# Patient Record
Sex: FEMALE | Race: WHITE | Employment: OTHER | URBAN - NONMETROPOLITAN AREA
[De-identification: names, ages, dates, MRNs, and addresses within clinical notes are randomized per-mention and may not be internally consistent; named-entity substitution may affect disease eponyms.]

---

## 2021-11-23 LAB
ALBUMIN SERPL-MCNC: 4.4 G/DL (ref 3.5–5)
ALBUMIN/GLOBULIN RATIO: 1.6 (ref 1.2–1.5)
ALK PHOSPHATASE: 80 U/L (ref 38–126)
ALT SERPL-CCNC: 27 U/L (ref 7–35)
AST SERPL-CCNC: 31 U/L (ref 10–42)
BASOPHILS # BLD: 2.1 % (ref 0–3)
BILIRUB SERPL-MCNC: 0.5 MG/DL (ref 0.3–1.2)
BUN BLDV-MCNC: 22 MG/DL (ref 7–22)
CALCIUM SERPL-MCNC: 9.6 MG/DL (ref 8.4–10.2)
CHLORIDE BLD-SCNC: 100 MEQ/L (ref 98–107)
CHOLESTEROL: 228 MG/DL (ref 0–200)
CO2: 26 MEQ/L (ref 22–31)
CREAT SERPL-MCNC: 0.7 MG/DL (ref 0.4–1.1)
EOSINOPHIL # BLD: 3.4 % (ref 0–4)
GFR SERPL CREATININE-BSD FRML MDRD: >=60 ML/MIN/{1.73_M2}
GLOBULIN: 2.8 G/DL (ref 2.9–3.3)
GLUCOSE: 89 MG/DL (ref 70–126)
HCT VFR BLD CALC: 43 % (ref 37–47)
HDLC SERPL-MCNC: 85 MG/DL (ref 40–60)
HEMOGLOBIN: 14.8 G/DL (ref 12–16)
LDL CHOLESTEROL: 131 MG/DL (ref 0–100)
LYMPHOCYTES # BLD: 36.1 % (ref 17.6–49.6)
Lab: NO
MCH RBC QN AUTO: 31.6 PG (ref 28–32)
MCHC RBC AUTO-ENTMCNC: 34.5 G/DL (ref 33–37)
MCV RBC AUTO: 91.6 FL (ref 81–99)
MONOCYTES # BLD: 9.3 % (ref 4.1–12.4)
PDW BLD-RTO: 13 % (ref 11.5–14.5)
PLATELET # BLD: 230 THOU/CUMM (ref 130–400)
POTASSIUM SERPL-SCNC: 4.7 MEQ/L (ref 3.5–5.1)
RBC: 4.69 MIL/CUMM (ref 4.2–5.4)
SCAN OF BLOOD SMEAR: NO
SEG NEUTROPHILS: 49.1 % (ref 39.4–72.5)
SODIUM BLD-SCNC: 138 MEQ/L (ref 136–145)
T4 FREE: 1.18 NG/DL (ref 0.61–1.12)
TOTAL PROTEIN: 7.2 G/DL (ref 6.4–8.3)
TRIGL SERPL-MCNC: 60 MG/DL (ref 40–150)
TSH SERPL DL<=0.05 MIU/L-ACNC: 0.1 UIU/ML (ref 0.49–4.67)
WBC: 3.7 THOU/CUMM (ref 4.8–10.8)

## 2021-12-17 RX ORDER — FLUTICASONE PROPIONATE 50 MCG
2 SPRAY, SUSPENSION (ML) NASAL DAILY
COMMUNITY

## 2021-12-17 RX ORDER — LEVOCETIRIZINE DIHYDROCHLORIDE 5 MG/1
5 TABLET, FILM COATED ORAL NIGHTLY
COMMUNITY

## 2021-12-17 RX ORDER — LEVOTHYROXINE SODIUM 0.15 MG/1
150 TABLET ORAL DAILY
COMMUNITY

## 2021-12-17 RX ORDER — MONTELUKAST SODIUM 10 MG/1
10 TABLET ORAL NIGHTLY
COMMUNITY

## 2021-12-17 RX ORDER — BUDESONIDE AND FORMOTEROL FUMARATE DIHYDRATE 160; 4.5 UG/1; UG/1
2 AEROSOL RESPIRATORY (INHALATION) 2 TIMES DAILY
COMMUNITY

## 2021-12-17 NOTE — PROGRESS NOTES
PAT call attempted, patient unavailable, left message to please call us back at your earliest convenience; [phone]

## 2021-12-24 ENCOUNTER — ANESTHESIA (OUTPATIENT)
Dept: OPERATING ROOM | Age: 71
End: 2021-12-24
Payer: MEDICARE

## 2021-12-24 ENCOUNTER — ANESTHESIA EVENT (OUTPATIENT)
Dept: OPERATING ROOM | Age: 71
End: 2021-12-24
Payer: MEDICARE

## 2021-12-24 ENCOUNTER — APPOINTMENT (OUTPATIENT)
Dept: GENERAL RADIOLOGY | Age: 71
End: 2021-12-24
Attending: PODIATRIST
Payer: MEDICARE

## 2021-12-24 ENCOUNTER — HOSPITAL ENCOUNTER (OUTPATIENT)
Age: 71
Setting detail: OUTPATIENT SURGERY
Discharge: HOME | End: 2021-12-24
Attending: PODIATRIST | Admitting: PODIATRIST
Payer: MEDICARE

## 2021-12-24 VITALS — SYSTOLIC BLOOD PRESSURE: 92 MMHG | OXYGEN SATURATION: 99 % | TEMPERATURE: 97.2 F | DIASTOLIC BLOOD PRESSURE: 47 MMHG

## 2021-12-24 VITALS
SYSTOLIC BLOOD PRESSURE: 119 MMHG | DIASTOLIC BLOOD PRESSURE: 58 MMHG | RESPIRATION RATE: 16 BRPM | HEIGHT: 67 IN | HEART RATE: 68 BPM | WEIGHT: 146 LBS | BODY MASS INDEX: 22.91 KG/M2 | OXYGEN SATURATION: 98 % | TEMPERATURE: 97.2 F

## 2021-12-24 DIAGNOSIS — Z41.9 ELECTIVE SURGERY: Primary | ICD-10-CM

## 2021-12-24 PROCEDURE — 7100000011 HC PHASE II RECOVERY - ADDTL 15 MIN: Performed by: PODIATRIST

## 2021-12-24 PROCEDURE — C1713 ANCHOR/SCREW BN/BN,TIS/BN: HCPCS | Performed by: PODIATRIST

## 2021-12-24 PROCEDURE — 3600000014 HC SURGERY LEVEL 4 ADDTL 15MIN: Performed by: PODIATRIST

## 2021-12-24 PROCEDURE — 7100000000 HC PACU RECOVERY - FIRST 15 MIN: Performed by: PODIATRIST

## 2021-12-24 PROCEDURE — 3700000000 HC ANESTHESIA ATTENDED CARE: Performed by: PODIATRIST

## 2021-12-24 PROCEDURE — C1776 JOINT DEVICE (IMPLANTABLE): HCPCS | Performed by: PODIATRIST

## 2021-12-24 PROCEDURE — 3209999900 FLUORO FOR SURGICAL PROCEDURES

## 2021-12-24 PROCEDURE — 2580000003 HC RX 258: Performed by: PODIATRIST

## 2021-12-24 PROCEDURE — 7100000001 HC PACU RECOVERY - ADDTL 15 MIN: Performed by: PODIATRIST

## 2021-12-24 PROCEDURE — 3700000001 HC ADD 15 MINUTES (ANESTHESIA): Performed by: PODIATRIST

## 2021-12-24 PROCEDURE — 7100000010 HC PHASE II RECOVERY - FIRST 15 MIN: Performed by: PODIATRIST

## 2021-12-24 PROCEDURE — 6360000002 HC RX W HCPCS: Performed by: NURSE ANESTHETIST, CERTIFIED REGISTERED

## 2021-12-24 PROCEDURE — 73660 X-RAY EXAM OF TOE(S): CPT

## 2021-12-24 PROCEDURE — 2709999900 HC NON-CHARGEABLE SUPPLY: Performed by: PODIATRIST

## 2021-12-24 PROCEDURE — 2500000003 HC RX 250 WO HCPCS: Performed by: PODIATRIST

## 2021-12-24 PROCEDURE — 6370000000 HC RX 637 (ALT 250 FOR IP): Performed by: STUDENT IN AN ORGANIZED HEALTH CARE EDUCATION/TRAINING PROGRAM

## 2021-12-24 PROCEDURE — 3600000004 HC SURGERY LEVEL 4 BASE: Performed by: PODIATRIST

## 2021-12-24 PROCEDURE — 6360000002 HC RX W HCPCS: Performed by: PODIATRIST

## 2021-12-24 DEVICE — IMPLANTABLE DEVICE
Type: IMPLANTABLE DEVICE | Status: FUNCTIONAL
Brand: LPT

## 2021-12-24 DEVICE — K- WIRE
Type: IMPLANTABLE DEVICE | Status: FUNCTIONAL
Brand: SWANSON

## 2021-12-24 RX ORDER — MECLIZINE HYDROCHLORIDE CHEWABLE TABLETS 25 MG/1
25 TABLET, CHEWABLE ORAL ONCE
Status: COMPLETED | OUTPATIENT
Start: 2021-12-24 | End: 2021-12-24

## 2021-12-24 RX ORDER — BUPIVACAINE HYDROCHLORIDE 5 MG/ML
INJECTION, SOLUTION EPIDURAL; INTRACAUDAL PRN
Status: DISCONTINUED | OUTPATIENT
Start: 2021-12-24 | End: 2021-12-24 | Stop reason: ALTCHOICE

## 2021-12-24 RX ORDER — PROPOFOL 10 MG/ML
INJECTION, EMULSION INTRAVENOUS PRN
Status: DISCONTINUED | OUTPATIENT
Start: 2021-12-24 | End: 2021-12-24 | Stop reason: SDUPTHER

## 2021-12-24 RX ORDER — FENTANYL CITRATE 50 UG/ML
INJECTION, SOLUTION INTRAMUSCULAR; INTRAVENOUS PRN
Status: DISCONTINUED | OUTPATIENT
Start: 2021-12-24 | End: 2021-12-24 | Stop reason: SDUPTHER

## 2021-12-24 RX ORDER — SODIUM CHLORIDE 9 MG/ML
25 INJECTION, SOLUTION INTRAVENOUS PRN
Status: DISCONTINUED | OUTPATIENT
Start: 2021-12-24 | End: 2021-12-24 | Stop reason: HOSPADM

## 2021-12-24 RX ORDER — FENTANYL CITRATE 50 UG/ML
50 INJECTION, SOLUTION INTRAMUSCULAR; INTRAVENOUS EVERY 5 MIN PRN
Status: DISCONTINUED | OUTPATIENT
Start: 2021-12-24 | End: 2021-12-24 | Stop reason: HOSPADM

## 2021-12-24 RX ORDER — MEPERIDINE HYDROCHLORIDE 25 MG/ML
12.5 INJECTION INTRAMUSCULAR; INTRAVENOUS; SUBCUTANEOUS EVERY 5 MIN PRN
Status: DISCONTINUED | OUTPATIENT
Start: 2021-12-24 | End: 2021-12-24 | Stop reason: HOSPADM

## 2021-12-24 RX ORDER — LIDOCAINE HYDROCHLORIDE 20 MG/ML
INJECTION, SOLUTION INTRAVENOUS PRN
Status: DISCONTINUED | OUTPATIENT
Start: 2021-12-24 | End: 2021-12-24 | Stop reason: SDUPTHER

## 2021-12-24 RX ORDER — CEFADROXIL 500 MG/1
500 CAPSULE ORAL 2 TIMES DAILY
Qty: 20 CAPSULE | Refills: 0 | Status: SHIPPED | OUTPATIENT
Start: 2021-12-24 | End: 2022-01-03

## 2021-12-24 RX ORDER — SODIUM CHLORIDE 9 MG/ML
INJECTION, SOLUTION INTRAVENOUS CONTINUOUS
Status: DISCONTINUED | OUTPATIENT
Start: 2021-12-24 | End: 2021-12-24 | Stop reason: HOSPADM

## 2021-12-24 RX ORDER — HYDRALAZINE HYDROCHLORIDE 20 MG/ML
5 INJECTION INTRAMUSCULAR; INTRAVENOUS EVERY 10 MIN PRN
Status: DISCONTINUED | OUTPATIENT
Start: 2021-12-24 | End: 2021-12-24 | Stop reason: HOSPADM

## 2021-12-24 RX ORDER — SODIUM CHLORIDE 0.9 % (FLUSH) 0.9 %
5-40 SYRINGE (ML) INJECTION EVERY 12 HOURS SCHEDULED
Status: DISCONTINUED | OUTPATIENT
Start: 2021-12-24 | End: 2021-12-24 | Stop reason: HOSPADM

## 2021-12-24 RX ORDER — PROMETHAZINE HYDROCHLORIDE 25 MG/ML
6.25 INJECTION, SOLUTION INTRAMUSCULAR; INTRAVENOUS
Status: DISCONTINUED | OUTPATIENT
Start: 2021-12-24 | End: 2021-12-24 | Stop reason: HOSPADM

## 2021-12-24 RX ORDER — OXYCODONE HYDROCHLORIDE AND ACETAMINOPHEN 5; 325 MG/1; MG/1
1 TABLET ORAL
Status: DISCONTINUED | OUTPATIENT
Start: 2021-12-24 | End: 2021-12-24 | Stop reason: HOSPADM

## 2021-12-24 RX ORDER — DIPHENHYDRAMINE HYDROCHLORIDE 50 MG/ML
12.5 INJECTION INTRAMUSCULAR; INTRAVENOUS
Status: DISCONTINUED | OUTPATIENT
Start: 2021-12-24 | End: 2021-12-24 | Stop reason: HOSPADM

## 2021-12-24 RX ORDER — SODIUM CHLORIDE 0.9 % (FLUSH) 0.9 %
5-40 SYRINGE (ML) INJECTION PRN
Status: DISCONTINUED | OUTPATIENT
Start: 2021-12-24 | End: 2021-12-24 | Stop reason: HOSPADM

## 2021-12-24 RX ORDER — ONDANSETRON 2 MG/ML
INJECTION INTRAMUSCULAR; INTRAVENOUS PRN
Status: DISCONTINUED | OUTPATIENT
Start: 2021-12-24 | End: 2021-12-24 | Stop reason: SDUPTHER

## 2021-12-24 RX ORDER — CYCLOBENZAPRINE HCL 5 MG
10 TABLET ORAL 3 TIMES DAILY PRN
Qty: 40 TABLET | Refills: 0 | Status: SHIPPED | OUTPATIENT
Start: 2021-12-24 | End: 2022-01-03

## 2021-12-24 RX ORDER — ONDANSETRON 2 MG/ML
4 INJECTION INTRAMUSCULAR; INTRAVENOUS
Status: DISCONTINUED | OUTPATIENT
Start: 2021-12-24 | End: 2021-12-24 | Stop reason: HOSPADM

## 2021-12-24 RX ORDER — DEXAMETHASONE SODIUM PHOSPHATE 10 MG/ML
INJECTION, EMULSION INTRAMUSCULAR; INTRAVENOUS PRN
Status: DISCONTINUED | OUTPATIENT
Start: 2021-12-24 | End: 2021-12-24 | Stop reason: SDUPTHER

## 2021-12-24 RX ORDER — HYDROCODONE BITARTRATE AND ACETAMINOPHEN 5; 325 MG/1; MG/1
1 TABLET ORAL EVERY 4 HOURS PRN
Qty: 42 TABLET | Refills: 0 | Status: SHIPPED | OUTPATIENT
Start: 2021-12-24 | End: 2021-12-31

## 2021-12-24 RX ORDER — FENTANYL CITRATE 50 UG/ML
25 INJECTION, SOLUTION INTRAMUSCULAR; INTRAVENOUS EVERY 5 MIN PRN
Status: DISCONTINUED | OUTPATIENT
Start: 2021-12-24 | End: 2021-12-24 | Stop reason: HOSPADM

## 2021-12-24 RX ORDER — ASPIRIN 325 MG
325 TABLET, DELAYED RELEASE (ENTERIC COATED) ORAL DAILY
Qty: 14 TABLET | Refills: 0 | Status: SHIPPED | OUTPATIENT
Start: 2021-12-24 | End: 2022-01-07

## 2021-12-24 RX ADMIN — CEFAZOLIN SODIUM 2000 MG: 10 INJECTION, POWDER, FOR SOLUTION INTRAVENOUS at 10:21

## 2021-12-24 RX ADMIN — MECLIZINE HYDROCHLORIDE 25 MG: 25 TABLET, CHEWABLE ORAL at 13:05

## 2021-12-24 RX ADMIN — PROPOFOL 150 MG: 10 INJECTION, EMULSION INTRAVENOUS at 10:14

## 2021-12-24 RX ADMIN — ONDANSETRON 4 MG: 2 INJECTION INTRAMUSCULAR; INTRAVENOUS at 11:19

## 2021-12-24 RX ADMIN — FENTANYL CITRATE 50 MCG: 50 INJECTION, SOLUTION INTRAMUSCULAR; INTRAVENOUS at 10:41

## 2021-12-24 RX ADMIN — FENTANYL CITRATE 100 MCG: 50 INJECTION, SOLUTION INTRAMUSCULAR; INTRAVENOUS at 10:14

## 2021-12-24 RX ADMIN — FENTANYL CITRATE 50 MCG: 50 INJECTION, SOLUTION INTRAMUSCULAR; INTRAVENOUS at 11:19

## 2021-12-24 RX ADMIN — LIDOCAINE HYDROCHLORIDE 100 MG: 20 INJECTION INTRAVENOUS at 10:14

## 2021-12-24 RX ADMIN — SODIUM CHLORIDE: 9 INJECTION, SOLUTION INTRAVENOUS at 09:16

## 2021-12-24 RX ADMIN — DEXAMETHASONE SODIUM PHOSPHATE 10 MG: 10 INJECTION, EMULSION INTRAMUSCULAR; INTRAVENOUS at 10:33

## 2021-12-24 ASSESSMENT — PULMONARY FUNCTION TESTS
PIF_VALUE: 13
PIF_VALUE: 14
PIF_VALUE: 4
PIF_VALUE: 14
PIF_VALUE: 13
PIF_VALUE: 11
PIF_VALUE: 14
PIF_VALUE: 13
PIF_VALUE: 6
PIF_VALUE: 13
PIF_VALUE: 12
PIF_VALUE: 12
PIF_VALUE: 13
PIF_VALUE: 10
PIF_VALUE: 13
PIF_VALUE: 13
PIF_VALUE: 6
PIF_VALUE: 13
PIF_VALUE: 13
PIF_VALUE: 14
PIF_VALUE: 13
PIF_VALUE: 15
PIF_VALUE: 13
PIF_VALUE: 2
PIF_VALUE: 13
PIF_VALUE: 1
PIF_VALUE: 1
PIF_VALUE: 13
PIF_VALUE: -14
PIF_VALUE: 15
PIF_VALUE: 13
PIF_VALUE: 13
PIF_VALUE: 1
PIF_VALUE: 13
PIF_VALUE: 2
PIF_VALUE: 1
PIF_VALUE: 13
PIF_VALUE: 10
PIF_VALUE: 12
PIF_VALUE: 13
PIF_VALUE: 13
PIF_VALUE: 15
PIF_VALUE: 14
PIF_VALUE: 13
PIF_VALUE: 15
PIF_VALUE: 13
PIF_VALUE: 15
PIF_VALUE: 13
PIF_VALUE: 14
PIF_VALUE: 14
PIF_VALUE: 15
PIF_VALUE: 13
PIF_VALUE: 7
PIF_VALUE: 13
PIF_VALUE: 12
PIF_VALUE: 13
PIF_VALUE: 13
PIF_VALUE: -15
PIF_VALUE: 13
PIF_VALUE: 12
PIF_VALUE: 13
PIF_VALUE: 13
PIF_VALUE: 19

## 2021-12-24 ASSESSMENT — PAIN SCALES - GENERAL
PAINLEVEL_OUTOF10: 0

## 2021-12-24 NOTE — ANESTHESIA PRE PROCEDURE
Department of Anesthesiology  Preprocedure Note       Name:  Yasir Britton   Age:  73 y.o.  :  1950                                          MRN:  [id number]         Date:  2021      Surgeon: Dillan Lopez):  Zan Dean DPM    Procedure: Procedure(s):  RIGHT ARMAAN ARTHROPLASTY FIRST METATARSOPHALANGEAL POSSIBLE FIFTH METATARSAOPHALANGEAL JOINT CAPSULOTOMY    Medications prior to admission:   Prior to Admission medications    Medication Sig Start Date End Date Taking?  Authorizing Provider   fluticasone (FLONASE) 50 MCG/ACT nasal spray 2 sprays by Each Nostril route daily   Yes Historical Provider, MD   levothyroxine (SYNTHROID) 150 MCG tablet Take 150 mcg by mouth Daily   Yes Historical Provider, MD   Omalizumab (Maurene Goel SC) Inject 300 mg into the skin Every 4 weeks   Yes Historical Provider, MD   levocetirizine (XYZAL) 5 MG tablet Take 5 mg by mouth nightly   Yes Historical Provider, MD   montelukast (SINGULAIR) 10 MG tablet Take 10 mg by mouth nightly   Yes Historical Provider, MD   budesonide-formoterol (SYMBICORT) 160-4.5 MCG/ACT AERO Inhale 2 puffs into the lungs 2 times daily   Yes Historical Provider, MD   NONFORMULARY Allergy shots every 3 weeks   Yes Historical Provider, MD   Albuterol (PROVENTIL IN) Inhale into the lungs as needed    Historical Provider, MD       Current medications:    Current Facility-Administered Medications   Medication Dose Route Frequency Provider Last Rate Last Admin    0.9 % sodium chloride infusion   IntraVENous Continuous Alver Ip,  mL/hr at 21 0916 New Bag at 21 0916    sodium chloride flush 0.9 % injection 5-40 mL  5-40 mL IntraVENous 2 times per day Alver Ip, DPM        sodium chloride flush 0.9 % injection 5-40 mL  5-40 mL IntraVENous PRN Alver Ip, DPM        0.9 % sodium chloride infusion  25 mL IntraVENous PRN Alver Ip, DPM        ceFAZolin (ANCEF) 2000 mg in dextrose 5 % 50 mL IVPB  2,000 mg IntraVENous On Call to [address], DPM           Allergies: Allergies   Allergen Reactions    Bee Venom Anaphylaxis    Nsaids Anaphylaxis     Pseudo allergy due to Samter's Triad    Seasonal      Takes allergy shots    Biaxin [Clarithromycin] Nausea And Vomiting    Tramadol      Causes itching but can tolerate if taken with benadryl    Versed [Midazolam] Nausea And Vomiting     When combined with fentanyl       Problem List:  There is no problem list on file for this patient.       Past Medical History:        Diagnosis Date    Cancer ([address]     Kidney stone     PONV (postoperative nausea and vomiting)     Samter's triad     Thyroid disease     goiter    Urticaria     hx of spontaneous about 2 years ago now takes Xolair injections       Past Surgical History:        Procedure Laterality Date    CHOLECYSTECTOMY      ERCP      EYE SURGERY      cataract with lense implant bilat    FINGER TRIGGER RELEASE      bilat    KNEE ARTHROSCOPY      2 on right and 1 on left    LUMBAR LAMINECTOMY      L5-S1    SINUS SURGERY      x5    TONSILLECTOMY      and adnoids       Social History:    Social History     Tobacco Use    Smoking status: Never Smoker    Smokeless tobacco: Never Used   Substance Use Topics    Alcohol use: Never                                Counseling given: Not Answered      Vital Signs (Current):   Vitals:    12/17/21 1026 12/24/21 0845   BP:  120/60   Pulse:  73   Resp:  18   Temp:  97.7 °F (36.5 °C)   TempSrc:  Temporal   SpO2:  100%   Weight: 146 lb (66.2 kg) 146 lb (66.2 kg)   Height: 5' 7\" (1.702 m) 5' 7\" (1.702 m)                                              BP Readings from Last 3 Encounters:   12/24/21 120/60       NPO Status: Time of last liquid consumption: 2200                        Time of last solid consumption: 2200                        Date of last liquid consumption: 12/23/21                        Date of last solid food consumption: 12/23/21    BMI:   Wt Readings from Last 3 Encounters:   12/24/21 146 lb (66.2 kg)     Body mass index is 22.87 kg/m². CBC:   Lab Results   Component Value Date    WBC 3.7 11/23/2021    RBC 4.69 11/23/2021    HGB 14.8 11/23/2021    HCT 43.0 11/23/2021    MCV 91.6 11/23/2021    RDW 13.0 11/23/2021     11/23/2021       CMP:   Lab Results   Component Value Date     11/23/2021    K 4.7 11/23/2021     11/23/2021    CO2 26 11/23/2021    BUN 22 11/23/2021    CREATININE 0.7 11/23/2021    LABGLOM >=60 11/23/2021    GLUCOSE 89 11/23/2021    CALCIUM 9.6 11/23/2021    BILITOT 0.5 11/23/2021    ALKPHOS 80 11/23/2021    AST 31 11/23/2021    ALT 27 11/23/2021       POC Tests: No results for input(s): POCGLU, POCNA, POCK, POCCL, POCBUN, POCHEMO, POCHCT in the last 72 hours. Coags: No results found for: PROTIME, INR, APTT    HCG (If Applicable): No results found for: PREGTESTUR, PREGSERUM, HCG, HCGQUANT     ABGs: No results found for: PHART, PO2ART, NNS0DLE, CDD4MTX, BEART, W0FMNLNL     Type & Screen (If Applicable):  No results found for: LABABO, LABRH    Drug/Infectious Status (If Applicable):  No results found for: HIV, HEPCAB    COVID-19 Screening (If Applicable): No results found for: COVID19        Anesthesia Evaluation     history of anesthetic complications: PONV. Airway: Mallampati: I  TM distance: >3 FB   Neck ROM: limited  Mouth opening: > = 3 FB Dental:          Pulmonary:normal exam    (+) asthma: allergic asthma,                            Cardiovascular:  Exercise tolerance: good (>4 METS),       (-) hypertension, past MI and CAD      Rhythm: regular  Rate: normal                    Neuro/Psych:      (-) seizures and CVA           GI/Hepatic/Renal:        (-) GERD, liver disease and no renal disease       Endo/Other:    (+) hypothyroidism::., .    (-) diabetes mellitus, hyperthyroidism                ROS comment: Hx chronic Urticaria Abdominal:             Vascular:     - DVT and PE.       Other Findings:           Anesthesia Plan      general     ASA 2     (PIV. Additional access can be obtained after induction if needed. Standard ASA monitors. IV/PO opioids and other adjuncts as needed for pain control. PACU post op for recovery. Possible anesthetics complications were discussed with the patient, including but not limited to: PONV, damage to the airway and surrounding structures (teeth, lips, gums, tongue, etc.), adverse reactions to medicine, cardiac complications (MI, CHF, arrhythmias, etc.), respiratory complications (post-op ventilation, respiratory failure, etc.), neurologic complications (nerve damage, stroke, seizure), and death. The patient was given the opportunity to ask questions and all questions were answered to the patient's satisfaction. The patient is in agreement with the anesthetic plan.  )  Induction: intravenous. Anesthetic plan and risks discussed with patient and spouse. Plan discussed with CRNA.                   Damian Stanford DO   12/24/2021

## 2021-12-24 NOTE — PROGRESS NOTES
ADMITTED TO SDS AND ORIENTED TO UNIT. SCDS ON. FALL AND ALLERGY BANDS ON. PT VERBALIZED APPROVAL FOR FIRST NAME, LAST INITIAL AND PHYSICIAN NAME ON UNIT WHITEBOARD. Spouse, Uzma Begumning with the patient.

## 2021-12-24 NOTE — PROGRESS NOTES
Return from PACU, family at bedside. Eating and drinking without problems. Good neuro and circ checks to foot.

## 2021-12-24 NOTE — PROGRESS NOTES
9850 1142 Pt arouses to name on arrival to PACU   1130 awake and oriented , denies any needs and drifts back to sleep   1144 awakens on own continues to deny pain , denies wanting to premedicate for pain , states she wants to wake up more , pt able to drift back to sleep easily   1155 awakens easily to name , continues to deny pain or nausea  1200 meets criteria for discharge , transported to [facility]

## 2021-12-24 NOTE — H&P
Department of Surgery  H&P Interval Note  Outpatient History and Physical was reviewed pre-operatively, with no interval changes to note prior to scheduled surgical intervention. Patient was assessed, all questions/concerns regarding per-operative management were addressed to patient satisfaction. Operative site was marked prior to transportation to the operative room. María Elena Villegas D.P.M.

## 2021-12-24 NOTE — ANESTHESIA POSTPROCEDURE EVALUATION
Department of Anesthesiology  Postprocedure Note    Patient: Chris Mcallister  MRN: [id number]  YOB: 1950  Date of evaluation: 12/24/2021  Time:  1:02 PM     Procedure Summary     Date: 12/24/21 Room / Location: [place] [address] / [facility]    Anesthesia Start: 1009 Anesthesia Stop: 1128    Procedure: RIGHT ARMAAN ARTHROPLASTY FIRST METATARSOPHALANGEAL,  FIFTH METATARSAOPHALANGEAL JOINT CAPSULOTOMY (Right Foot) Diagnosis: (RIGHT HALLUX LIMITUS FIFTH METATARSAL PHALANGEAL JOINT ARTHRITIS)    Surgeons: Erasto Paez DPM Responsible Provider: Reyna Baez DO    Anesthesia Type: general ASA Status: 2          Anesthesia Type: general    Kevin Phase I: Kevin Score: 10    Kevin Phase II: Kevin Score: 10    Last vitals: Reviewed and per EMR flowsheets.        Anesthesia Post Evaluation    Patient location during evaluation: PACU  Patient participation: complete - patient participated  Level of consciousness: awake and alert  Airway patency: patent  Nausea & Vomiting: no vomiting and no nausea  Complications: no  Cardiovascular status: hemodynamically stable  Respiratory status: acceptable  Hydration status: stable

## 2021-12-24 NOTE — BRIEF OP NOTE
Brief Postoperative Note      Patient: Gloria Mott  YOB: 1950  MRN: [id number]    Date of Procedure: 12/24/2021    Pre-Op Diagnosis: RIGHT HALLUX LIMITUS FIFTH METATARSAL PHALANGEAL JOINT ARTHRITIS, metatarsal deformity 5th     Post-Op Diagnosis: Same       Procedure(s):  RIGHT ARMAAN ARTHROPLASTY FIRST METATARSOPHALANGEAL,  FIFTH METATARSAOPHALANGEAL JOINT CAPSULOTOMY    Surgeon:  ARVIND Nicholas, PGY I    Assistant:  * No surgical staff found *    Anesthesia: General    Estimated Blood Loss (mL): Minimal    Complications: None    Specimens:   * No specimens in log *    Implants:  Implant Name Type Inv. Item Serial No.  Lot No. LRB No. Used Action   IMPLANT TOE JT SZ 2 L15MM REG TI MED BILAT PERF H LPT  IMPLANT TOE JT SZ 2 L15MM REG TI MED BILAT PERF H LPT  [employer]  Right 1 Implanted   K WIRE FIX DIA1. 4MM FOR JUAN DAVID GREAT TOE LPT  K WIRE FIX DIA1. 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

## 2021-12-27 NOTE — OP NOTE
[address] [place] [address]                                OPERATIVE REPORT    PATIENT NAME: Usama Phan                  :        1950  MED REC NO:   [id number]                           ROOM:  ACCOUNT NO:   [de-identified]                           ADMIT DATE: 2021  PROVIDER:     Marcelina Lehman. Lisandra Black D.P.M. DATE OF PROCEDURE:  2021    SURGEON:  Marcelina Lehman. Lisandra Black DPM    ASSISTANT:  Yessenia Whyte, PGY-1    PREOPERATIVE DIAGNOSES:  Include hallux limitus, right first  metatarsophalangeal joint as well as a right fifth metatarsal deformity  with fifth metatarsal joint capsulitis. POSTOPERATIVE DIAGNOSIS:  Hallux limitus, right first  metatarsophalangeal joint as well as a right fifth metatarsal deformity  with fifth metatarsal joint capsulitis. OPERATIONS PERFORMED:  1. A right first metatarsophalangeal joint cheilectomy with liberty-joint  arthroplasty. 2.  Fifth metatarsophalangeal joint capsulotomy with a fifth metatarsal  ostectomy. ANESTHESIA:  General with the addition of prophylactic antibiotics  coverage as well 20 mL 0.5% bupivacaine plain. HEMOSTASIS:  A well-padded pneumatic thigh tourniquet at 250 mmHg for  approximately 45 minutes. ESTIMATED BLOOD LOSS:  Tourniquet plus 10 mL. MATERIALS USED:  Consisted of a size 2 [employer] [place] liberty-joint  arthroplasty. INJECTABLES:  None. CONDITION:  Stable. COMPLICATIONS:  None. SPECIMENS:  Obtained. INDICATIONS FOR PROCEDURE:  The patient is a pleasant female who we have  been treating for hallux limitus for some time now. The patient has  undergone multiple corticosteroid injections in the past.  We got to a  point now where she was having intermittent pain despite use of  corticosteroid injections, shoe modifications, as well as carbon fiber  insole.   Based on nature and location of the pain as well as lack of  improvement with conservative care, the patient elected to proceed with  surgical extraction of the right forefoot. All questions and concerns  regarding perioperative management including benefits, risks,  complications, and alternatives of the procedure were discussed in  detail. DESCRIPTION OF PROCEDURE:  The patient was seen preoperatively. Consent  reviewed and signed. Operative limb was marked. The patient was taken  to the operative room, placed in supine position, administered general  anesthetic. The patient received a prophylactic antibiotic and the  operative site was supplemented with 20 mL 0.5% bupivacaine plain. Foot  was scrubbed with chlorhexidine surgical scrub and draped in sterile  fashion. PROCEDURE IN DETAIL:  1. A right foot metatarsophalangeal joint cheilectomy with liberty-joint  arthroplasty. Attention was directed to the dorsum aspect of the right  first metatarsophalangeal joint. A longitudinal incision approximately  6 cm in length was made medial to the coursing extensor hallucis longus  tendon. This incision was made full thickness with a 15 scalpel blade  followed by blunt dissection with Metzenbaum scissors and electrocautery  Bovie. The capsule of the first metatarsophalangeal joint was  subdivided in medial and lateral halves, followed by using a 15 scalpel  blade to release the medial and lateral collateral ligaments. A  McG[employer] elevator was used to gain access to the first  metatarsophalangeal joint, at which point we then performed a  cheilectomy of the first metatarsal head resected in the dorsal  one-third as well as the nonviable bone of the cuff of the proximal  phalanx base. A TPS sagittal saw was then used to create a dorsal to  plantar based through-and-through osteotomy, resecting the proximal  one-fourth of the phalangeal base down to level of intramedullary  bleeding bone.   Residual bone was resected using a rongeur bone pick and  a size 2 [facility] liberty-joint arthroplasty trial was placed noting  good excellent coverage over the residual bone. This was pinned in  place using a Steinmann pin, followed by use of an intramedullary reamer  and a size 2 broach. The instrumentation was removed. A size 2 trial  was then placed and a good adequate coverage as well as positioning  looked clinically as well as radiographically. We then obtained a size  2 liberty-joint arthroplasty from [facility] which was then impacted  with a small bone and tamp noting good apposition of the bone surfaces  as well as anatomical alignment on both AP and lateral fluoroscopic  imaging. A medial based capsulorrhaphy was performed removing a 3 mm  biased wedge from the capsular tissue which was then imbricated with an  intracapsular 2-0 FiberWire closure, followed by closure of the capsular  tissue with a running 2-0 Vicryl stitch, followed by 3-0 Monocryl and  3-0 Prolene for subcutaneous and primary skin closure. 2.  Right fifth metatarsophalangeal joint capsulotomy. A longitudinal  incision was then made overlying the dorsolateral aspect of the right  fifth metatarsophalangeal joint approximately 3 in length. This  incision was made full thickness with a 15 blade scalpel, followed by  blunt dissection with Metzenbaum scissors and electrocautery Bovie. The  patient was noted to have a dorsal-based contraction of the fifth  metatarsophalangeal joint which was released using a 15 scalpel blade  carefully protecting the long extensor tendon as well as releasing the  dorsal-based contracture allowing for rectus positioning of the toe  within the sagittal plane. During the dissection, the patient was noted  to have hypertrophic fifth metatarsal head. 3.  Fifth metatarsal ostectomy.   Following release of the fifth  metatarsophalangeal joint capsular contraction, TPS sagittal saw was  then used to create a Silver's osteotomy resecting the lateral  hypertrophic bone of the metatarsal as well as a planer planning of the  condylar surface of the metatarsal as well. This was then contoured  using a small hand rasp. Operative site was then flushed with copious  amounts of normal saline, closed in layered fashion consisting of 2-0  Vicryl for capsular closure, followed by 3-0 Monocryl and 3-0 Prolene  for subcutaneous and primary skin closure. The patient tolerated the  procedure well and was placed in Betadine wet-to-dry dressing with  application of short-leg posterior splint. The patient was transferred  to the PACU in stable condition. All questions and concerns regarding  postoperative management were addressed.         Lazarus Lied, D.P.M.    D: 12/27/2021 7:48:16       T: 12/27/2021 13:26:11     [facility]  Job#: [id number]     Doc#: [id number]    CC:

## 2024-09-13 ENCOUNTER — LAB (OUTPATIENT)
Age: 74
End: 2024-09-13

## 2024-09-23 LAB — CYTOLOGY THIN PREP PAP: NORMAL

## 2024-10-21 ENCOUNTER — LAB (OUTPATIENT)
Age: 74
End: 2024-10-21

## 2024-10-21 LAB
BASOPHILS ABSOLUTE: 0.1 THOU/MM3 (ref 0–0.1)
BASOPHILS NFR BLD AUTO: 1.6 %
CRP SERPL-MCNC: < 0.3 MG/DL (ref 0–1)
DEPRECATED RDW RBC AUTO: 42.4 FL (ref 35–45)
EOSINOPHIL NFR BLD AUTO: 3 %
EOSINOPHILS ABSOLUTE: 0.1 THOU/MM3 (ref 0–0.4)
ERYTHROCYTE [DISTWIDTH] IN BLOOD BY AUTOMATED COUNT: 12 % (ref 11.5–14.5)
ERYTHROCYTE [SEDIMENTATION RATE] IN BLOOD BY WESTERGREN METHOD: 13 MM/HR (ref 0–20)
HCT VFR BLD AUTO: 37.5 % (ref 37–47)
HGB BLD-MCNC: 12.7 GM/DL (ref 12–16)
IMM GRANULOCYTES # BLD AUTO: 0.01 THOU/MM3 (ref 0–0.07)
IMM GRANULOCYTES NFR BLD AUTO: 0.3 %
LYMPHOCYTES ABSOLUTE: 1.3 THOU/MM3 (ref 1–4.8)
LYMPHOCYTES NFR BLD AUTO: 36.3 %
MCH RBC QN AUTO: 32.8 PG (ref 26–33)
MCHC RBC AUTO-ENTMCNC: 33.9 GM/DL (ref 32.2–35.5)
MCV RBC AUTO: 96.9 FL (ref 81–99)
MONOCYTES ABSOLUTE: 0.4 THOU/MM3 (ref 0.4–1.3)
MONOCYTES NFR BLD AUTO: 9.9 %
NEUTROPHILS ABSOLUTE: 1.8 THOU/MM3 (ref 1.8–7.7)
NEUTROPHILS NFR BLD AUTO: 48.9 %
NRBC BLD AUTO-RTO: 0 /100 WBC
PLATELET # BLD AUTO: 259 THOU/MM3 (ref 130–400)
PMV BLD AUTO: 10 FL (ref 9.4–12.4)
RBC # BLD AUTO: 3.87 MILL/MM3 (ref 4.2–5.4)
URATE SERPL-MCNC: 3.6 MG/DL (ref 2.4–5.7)
WBC # BLD AUTO: 3.7 THOU/MM3 (ref 4.8–10.8)

## 2024-12-19 ENCOUNTER — LAB (OUTPATIENT)
Age: 74
End: 2024-12-19

## 2024-12-19 LAB
ALBUMIN SERPL BCG-MCNC: 4.1 G/DL (ref 3.5–5.1)
ALP SERPL-CCNC: 67 U/L (ref 38–126)
ALT SERPL W/O P-5'-P-CCNC: 16 U/L (ref 11–66)
ANION GAP SERPL CALC-SCNC: 9 MEQ/L (ref 8–16)
AST SERPL-CCNC: 26 U/L (ref 5–40)
BASOPHILS ABSOLUTE: 0.1 THOU/MM3 (ref 0–0.1)
BASOPHILS NFR BLD AUTO: 1.6 %
BILIRUB SERPL-MCNC: 0.5 MG/DL (ref 0.3–1.2)
BUN SERPL-MCNC: 22 MG/DL (ref 7–22)
CALCIUM SERPL-MCNC: 9.6 MG/DL (ref 8.5–10.5)
CHLORIDE SERPL-SCNC: 105 MEQ/L (ref 98–111)
CHOLEST SERPL-MCNC: 202 MG/DL (ref 100–199)
CO2 SERPL-SCNC: 27 MEQ/L (ref 23–33)
CREAT SERPL-MCNC: 0.6 MG/DL (ref 0.4–1.2)
DEPRECATED RDW RBC AUTO: 41 FL (ref 35–45)
EOSINOPHIL NFR BLD AUTO: 5.6 %
EOSINOPHILS ABSOLUTE: 0.2 THOU/MM3 (ref 0–0.4)
ERYTHROCYTE [DISTWIDTH] IN BLOOD BY AUTOMATED COUNT: 11.8 % (ref 11.5–14.5)
GFR SERPL CREATININE-BSD FRML MDRD: > 90 ML/MIN/1.73M2
GLUCOSE SERPL-MCNC: 87 MG/DL (ref 70–108)
HCT VFR BLD AUTO: 39.1 % (ref 37–47)
HDLC SERPL-MCNC: 74 MG/DL
HGB BLD-MCNC: 13.1 GM/DL (ref 12–16)
IMM GRANULOCYTES # BLD AUTO: 0 THOU/MM3 (ref 0–0.07)
IMM GRANULOCYTES NFR BLD AUTO: 0 %
LDLC SERPL CALC-MCNC: 112 MG/DL
LYMPHOCYTES ABSOLUTE: 1.6 THOU/MM3 (ref 1–4.8)
LYMPHOCYTES NFR BLD AUTO: 41.2 %
MCH RBC QN AUTO: 31.7 PG (ref 26–33)
MCHC RBC AUTO-ENTMCNC: 33.5 GM/DL (ref 32.2–35.5)
MCV RBC AUTO: 94.7 FL (ref 81–99)
MONOCYTES ABSOLUTE: 0.4 THOU/MM3 (ref 0.4–1.3)
MONOCYTES NFR BLD AUTO: 11.4 %
NEUTROPHILS ABSOLUTE: 1.5 THOU/MM3 (ref 1.8–7.7)
NEUTROPHILS NFR BLD AUTO: 40.2 %
NRBC BLD AUTO-RTO: 0 /100 WBC
PLATELET # BLD AUTO: 246 THOU/MM3 (ref 130–400)
PMV BLD AUTO: 9.5 FL (ref 9.4–12.4)
POTASSIUM SERPL-SCNC: 5.4 MEQ/L (ref 3.5–5.2)
PROT SERPL-MCNC: 6.4 G/DL (ref 6.1–8)
RBC # BLD AUTO: 4.13 MILL/MM3 (ref 4.2–5.4)
SODIUM SERPL-SCNC: 141 MEQ/L (ref 135–145)
T4 FREE SERPL-MCNC: 1.59 NG/DL (ref 0.93–1.68)
TRIGL SERPL-MCNC: 80 MG/DL (ref 0–199)
TSH SERPL DL<=0.005 MIU/L-ACNC: 0.15 UIU/ML (ref 0.4–4.2)
WBC # BLD AUTO: 3.8 THOU/MM3 (ref 4.8–10.8)

## 2025-07-24 ENCOUNTER — LAB (OUTPATIENT)
Age: 75
End: 2025-07-24

## 2025-07-24 LAB — TSH SERPL DL<=0.05 MIU/L-ACNC: 0.63 UIU/ML (ref 0.27–4.2)

## 2025-08-25 ENCOUNTER — LAB (OUTPATIENT)
Age: 75
End: 2025-08-25

## 2025-08-25 LAB — LIPASE SERPL-CCNC: 39 U/L (ref 13–60)

## (undated) DEVICE — SPONGE GZ W4XL4IN COT 12 PLY TYP VII WVN C FLD DSGN

## (undated) DEVICE — PACK PROCEDURE SURG SET UP SRMC

## (undated) DEVICE — BANDAGE COMPR E SGL LAYERED CLSR BGE W/ CLP W4INXL15FT

## (undated) DEVICE — DRAPE,U/SHT,SPLIT,FILM,60X84,STERILE: Brand: MEDLINE

## (undated) DEVICE — IMPREGNATED GAUZE DRESSING: Brand: CUTICERIN 7.5X7.5CM CTN 50

## (undated) DEVICE — BANDAGE,GAUZE,4.5"X4.1YD,STERILE,LF: Brand: MEDLINE

## (undated) DEVICE — BASIC SINGLE BASIN BTC-LF: Brand: MEDLINE INDUSTRIES, INC.

## (undated) DEVICE — PENCIL SMK EVAC ALL IN 1 DSGN ENH VISIBILITY IMPROVED AIR

## (undated) DEVICE — DRAPE,EXTREMITY,89X128,STERILE: Brand: MEDLINE

## (undated) DEVICE — 1016 S-DRAPE IRRIG POUCH 10/BOX: Brand: STERI-DRAPE™

## (undated) DEVICE — SPONGE LAP W18XL18IN WHT COT 4 PLY FLD STRUNG RADPQ DISP ST

## (undated) DEVICE — THIN OFFSET (9.0 X 0.38 X 25.0MM)

## (undated) DEVICE — BANDAGE COMPR W4INXL12FT E DISP ESMARCH EVEN

## (undated) DEVICE — PACK-MAJOR

## (undated) DEVICE — GLOVE ORANGE PI 8   MSG9080